# Patient Record
Sex: MALE | Race: WHITE | URBAN - METROPOLITAN AREA
[De-identification: names, ages, dates, MRNs, and addresses within clinical notes are randomized per-mention and may not be internally consistent; named-entity substitution may affect disease eponyms.]

---

## 2018-04-09 ENCOUNTER — OFFICE VISIT (OUTPATIENT)
Dept: FAMILY MEDICINE CLINIC | Facility: CLINIC | Age: 48
End: 2018-04-09

## 2018-04-09 VITALS
DIASTOLIC BLOOD PRESSURE: 74 MMHG | WEIGHT: 256 LBS | BODY MASS INDEX: 31.83 KG/M2 | SYSTOLIC BLOOD PRESSURE: 118 MMHG | HEART RATE: 94 BPM | TEMPERATURE: 98 F | HEIGHT: 75 IN | RESPIRATION RATE: 18 BRPM | OXYGEN SATURATION: 98 %

## 2018-04-09 DIAGNOSIS — S89.91XA INJURY OF RIGHT LOWER EXTREMITY, INITIAL ENCOUNTER: ICD-10-CM
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
## 2018-04-09 PROCEDURE — 99202 OFFICE O/P NEW SF 15 MIN: CPT | Performed by: NURSE PRACTITIONER

## 2018-04-09 NOTE — PATIENT INSTRUCTIONS
Muscle Strain in the Extremities  A muscle strain is a stretching and tearing of muscle fibers. This causes pain, especially when you move that muscle. There may also be some swelling and bruising.   Home care  · Keep the hurt area raised to reduce pain a Rest an injury, elevate it, and use ice and compression as directed. RICE stands for rest, ice, compression, and elevation. These can limit pain and swelling after an injury.  RICE may be recommended to help treat fractures, sprains, strains, and bruises · Signs of infection. These include warmth in the skin, redness, drainage, or bad smell coming from the injured body part. Date Last Reviewed: 1/18/2016  © 5538-4381 [employer]. [address] [facility] [place], [address] [place] [address].  All rights reserved

## 2018-04-09 NOTE — PROGRESS NOTES
CHIEF COMPLAINT:     Patient presents with:  Leg Pain: Right leg,Slipped on stairs, 2 days,ago. HPI:   Evelio Jimenez is a 49year old male who presents with complaints of right leg pain after falling on the stairs 2 days ago.  Pt report pain is impr clear to auscultation bilaterally, no wheezes or rhonchi. Breathing is non labored. CARDIO: RRR without murmur  EXTREMITIES: Right leg mild bruising on shin, no erythema, no warmth, no tenderness with palpation, mild swelling lateral knee.  PP+2 bilateral.